# Patient Record
Sex: MALE | Race: OTHER | NOT HISPANIC OR LATINO | Employment: PART TIME | URBAN - METROPOLITAN AREA
[De-identification: names, ages, dates, MRNs, and addresses within clinical notes are randomized per-mention and may not be internally consistent; named-entity substitution may affect disease eponyms.]

---

## 2023-07-11 ENCOUNTER — OCCUPATIONAL MEDICINE (OUTPATIENT)
Dept: URGENT CARE | Facility: CLINIC | Age: 23
End: 2023-07-11
Payer: COMMERCIAL

## 2023-07-11 VITALS
TEMPERATURE: 98.1 F | RESPIRATION RATE: 20 BRPM | HEIGHT: 69 IN | HEART RATE: 68 BPM | SYSTOLIC BLOOD PRESSURE: 94 MMHG | OXYGEN SATURATION: 96 % | WEIGHT: 150 LBS | DIASTOLIC BLOOD PRESSURE: 50 MMHG | BODY MASS INDEX: 22.22 KG/M2

## 2023-07-11 DIAGNOSIS — S39.012A STRAIN OF LUMBAR REGION, INITIAL ENCOUNTER: ICD-10-CM

## 2023-07-11 DIAGNOSIS — M54.50 ACUTE RIGHT-SIDED LOW BACK PAIN WITHOUT SCIATICA: ICD-10-CM

## 2023-07-11 PROCEDURE — 3078F DIAST BP <80 MM HG: CPT | Performed by: NURSE PRACTITIONER

## 2023-07-11 PROCEDURE — 3074F SYST BP LT 130 MM HG: CPT | Performed by: NURSE PRACTITIONER

## 2023-07-11 PROCEDURE — 99203 OFFICE O/P NEW LOW 30 MIN: CPT | Performed by: NURSE PRACTITIONER

## 2023-07-11 RX ORDER — KETOROLAC TROMETHAMINE 30 MG/ML
30 INJECTION, SOLUTION INTRAMUSCULAR; INTRAVENOUS ONCE
Status: COMPLETED | OUTPATIENT
Start: 2023-07-11 | End: 2023-07-11

## 2023-07-11 RX ORDER — CYCLOBENZAPRINE HCL 5 MG
5-10 TABLET ORAL
Qty: 15 TABLET | Refills: 0 | Status: SHIPPED | OUTPATIENT
Start: 2023-07-11 | End: 2023-08-22

## 2023-07-11 RX ADMIN — KETOROLAC TROMETHAMINE 30 MG: 30 INJECTION, SOLUTION INTRAMUSCULAR; INTRAVENOUS at 17:32

## 2023-07-11 NOTE — LETTER
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    Medical Information   Related to Industrial Injury? Yes    Subjective Complaints:  DOI: 7/10/23 ELBA: Patient reports he was lifting a heavy box yesterday and he felt a pop in his low back.  He states he did have pain instantly.  He states over the past 24 hours the pain has worsened, he was not able to go to work today.  He has not taken anything for the pain.  He currently reports he has pain with changing positions, walking and bending over to his right low back.  He states it does hurt midline and radiates a little bit to the left as well.  Patient denies any saddle anesthesia radiation of pain into his legs, loss of bowel or bladder control, fever, body aches or IV drug use.  He denies focal progressive neurological deficits or coldness in his extremities as discussed.  He denies direct trauma to his low back previous injuries.     Objective Findings: Physical Exam  Constitutional:       Appearance: Normal appearance.   HENT:      Head: Normocephalic and atraumatic.   Musculoskeletal:      Cervical back: Normal.      Thoracic back: Normal.      Lumbar back: Spasms and tenderness present. No swelling, signs of trauma or bony tenderness. Decreased range of motion. Negative right straight leg raise test and negative left straight leg raise test. No scoliosis.        Back:      Neurological:      Mental Status: He is alert and oriented to person, place, and time.      Cranial Nerves: Cranial nerves 2-12 are intact.      Motor: Motor function is intact.      Gait: Gait is intact.      Deep Tendon Reflexes:      Reflex Scores:       Patellar reflexes are 2+ on the right side and 2+ on the left side.       Pre-Existing Condition(s):     Assessment:   Initial Visit    Status: Additional Care Required  Permanent Disability:No    Plan: Medication  Comments:Toradol injection in clinic, work restrictions, ibuprofen at home Flexeril at night ice and rest    Diagnostics:      Comments:       Disability Information   Status: Released to Restricted Duty    From:  2023  Through: 7/15/2023 Restrictions are: Temporary   Physical Restrictions   Sitting:    Standing:  < or = to 4 hrs/day Stoopin hrs/day Bendin hrs/day   Squattin hrs/day Walking:  < or = to 4 hrs/day Climbin hrs/day Pushin hrs/day   Pullin hrs/day Other:    Reaching Above Shoulder (L):   Reaching Above Shoulder (R):       Reaching Below Shoulder (L):  0 hrs/day Reaching Below Shoulder (R):  0 hrs/day   Not to exceed Weight Limits   Carrying(hrs):   Weight Limit(lb): < or = to 10 pounds Lifting(hrs):   Weight  Limit(lb): < or = to 10 pounds   Comments: Recommend desk or sedentary work.  Toradol injection in clinic, work restrictions, ibuprofen at home Flexeril at night ice and rest    Repetitive Actions   Hands: i.e. Fine Manipulations from Grasping:     Feet: i.e. Operating Foot Controls:     Driving / Operate Machinery:     Health Care Provider’s Original or Electronic Signature  Marium Callahan, A.P.R.N. Health Care Provider’s Original or Electronic Signature    Vazquez Pickett DO MPH     Clinic Name / Location: [facility]  [address] [place], [place] [address]  [place] [address] Clinic Phone Number: Dept: [phone]   Appointment Time: 5:15 Pm Visit Start Time: 5:18 PM   Check-In Time:   5:16 Pm Visit Discharge Time:  5:45 PM   Original-Treating Physician or Chiropractor    Page 2-Insurer/TPA    Page 3-Employer    Page 4-Employee

## 2023-07-11 NOTE — LETTER
"EMPLOYEE’S CLAIM FOR COMPENSATION/ REPORT OF INITIAL TREATMENT  FORM C-4  PLEASE TYPE OR PRINT    EMPLOYEE’S CLAIM - PROVIDE ALL INFORMATION REQUESTED   First Name  Gregg Last Name  Darby Birthdate                    2000                Sex  male Claim Number (Insurer’s Use Only)   Home Address  [address] Age  23 y.o. 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 (if applicable)  LIFTING BOXES    How did this injury or occupational disease occur? (Be specific and answer in detail. Use additional sheet if necessary)  LIFTING A BOX   If you believe that you have an occupational disease, when did you first have knowledge of the disability and its relationship to your employment?  N/A Witnesses to the Accident (if applicable)  FAIZA ELIAS      Nature of Injury or Occupational Disease  Sprain  Part(s) of Body Injured or Affected  Lower Back Area (Lumbar Area & Lumbo-Sacral), N/A, N/A    I CERTIFY THAT THE ABOVE IS TRUE AND CORRECT TO T HE BEST OF MY KNOWLEDGE AND THAT I HAVE " PROVIDED THIS INFORMATION IN ORDER TO OBTAIN THE BENEFITS OF [place], OR [place]).  I HEREBY AUTHORIZE ANY PHYSICIAN, CHIROPRACTOR, SURGEON, PRACTITIONER OR ANY OTHER PERSON, ANY HOSPITAL, INCLUDING [facility] [place] [facility] OR [facility], ANY  MEDICAL SERVICE ORGANIZATION, ANY INSURANCE COMPANY, OR OTHER INSTITUTION OR ORGANIZATION TO RELEASE TO EACH OTHER, ANY MEDICAL OR OTHER INFORMATION, INCLUDING BENEFITS PAID OR PAYABLE, PERTINENT TO THIS INJURY OR DISEASE, EXCEPT INFORMATION RELATIVE TO DIAGNOSIS, TREATMENT AND/OR COUNSELING FOR AIDS, PSYCHOLOGICAL CONDITIONS, ALCOHOL OR CONTROLLED SUBSTANCES, FOR WHICH I MUST GIVE SPECIFIC AUTHORIZATION.  A PHOTOSTAT OF THIS AUTHORIZATION SHALL BE VALID AS THE ORIGINAL.       Date7/11/2023   [facility] Employee’s Original or  *Electronic Signature   THIS REPORT MUST BE COMPLETED AND MAILED WITHIN 3 WORKING DAYS OF TREATMENT   Place  [facility]  Name of Facility  [facility]   Date  7/11/2023 Diagnosis and Description of Injury or Occupational Disease  (M54.50) Acute right-sided low back pain without sciatica  (S39.012A) Strain of lumbar region, initial encounter Is there evidence that the injured employee was under the influence of alcohol and/or another controlled substance at the time of accident?  ? No ? Yes (if yes, please explain)   Hour  5:18 PM   Diagnoses of Acute right-sided low back pain without sciatica and Strain of lumbar region, initial encounter were pertinent to this visit. No   Treatment  Work restrictions, muscle relaxers to be used at night, in clinic Toradol NSAIDs at home and ice.  Have you advised the patient to remain off work five days or     more?    X-Ray Findings      ? Yes Indicate dates:   From   To      From information given by the employee, together with medical evidence, can        you directly connect this injury or  "occupational disease as job incurred?  Yes ? No If no, is the injured employee capable of:  ? full duty  No ? modified duty  Yes   Is additional medical care by a physician indicated?  Yes If modified duty, specify any limitations / restrictions  See D-39   Do you know of any previous injury or disease contributing to this condition or occupational disease?  ? Yes ? No (Explain if yes)                          No   Date  7/11/2023 Print Health Care Provider's  Name  Yany Callahan, COLLEEN I certify that the employer’s copy of  this form was delivered to the employer on:   Address  [address] Insurer’s Use Only     [facility] Zip  [address]    Provider’s Tax ID Number  [id number] Telephone  Dept: [phone]             Health Care Provider’s Original or Electronic Signature  e-SignGENFRANCY, YANY MACIAS Degree (MD,DO, DC,PA-C,APRN)  APRN      * Complete and attach Release of Information (Form C-4A) when injured employee signs C-4 Form electronically  ORIGINAL - TREATING HEALTHCARE PROVIDER PAGE 2 - INSURER/TPA PAGE 3 - EMPLOYER PAGE 4 - EMPLOYEE             Form C-4 (rev.08/21)           BRIEF DESCRIPTION OF RIGHTS AND BENEFITS  (Pursuant to [place] 616C.050)    Notice of Injury or Occupational Disease (Incident Report Form C-1): If an injury or occupational disease (OD) arises out of and in the course of employment, you must provide written notice to your employer as soon as practicable, but no later than 7 days after the accident or OD. Your employer shall maintain a sufficient supply of the required forms.    Claim for Compensation (Form C-4): If medical treatment is sought, the form C-4 is available at the place of initial treatment. A completed \"Claim for Compensation\" (Form C-4) must be filed within 90 days after an accident or OD. The treating physician or chiropractor must, within 3 working days after treatment, complete and mail to the employer, the employer's insurer and " third-party , the Claim for Compensation.    Medical Treatment: If you require medical treatment for your on-the-job injury or OD, you may be required to select a physician or chiropractor from a list provided by your workers’ compensation insurer, if it has contracted with an Organization for Managed Care (MCO) or Preferred Provider Organization (PPO) or providers of health care. If your employer has not entered into a contract with an MCO or PPO, you may select a physician or chiropractor from the Panel of Physicians and Chiropractors. Any medical costs related to your industrial injury or OD will be paid by your insurer.    Temporary Total Disability (TTD): If your doctor has certified that you are unable to work for a period of at least 5 consecutive days, or 5 cumulative days in a 20-day period, or places restrictions on you that your employer does not accommodate, you may be entitled to TTD compensation.    Temporary Partial Disability (TPD): If the wage you receive upon reemployment is less than the compensation for TTD to which you are entitled, the insurer may be required to pay you TPD compensation to make up the difference. TPD can only be paid for a maximum of 24 months.    Permanent Partial Disability (PPD): When your medical condition is stable and there is an indication of a PPD as a result of your injury or OD, within 30 days, your insurer must arrange for an evaluation by a rating physician or chiropractor to determine the degree of your PPD. The amount of your PPD award depends on the date of injury, the results of the PPD evaluation, your age and wage.    Permanent Total Disability (PTD): If you are medically certified by a treating physician or chiropractor as permanently and totally disabled and have been granted a PTD status by your insurer, you are entitled to receive monthly benefits not to exceed 66 2/3% of your average monthly wage. The amount of your PTD payments is subject to  reduction if you previously received a lump-sum PPD award.    Vocational Rehabilitation Services: You may be eligible for vocational rehabilitation services if you are unable to return to the job due to a permanent physical impairment or permanent restrictions as a result of your injury or occupational disease.    Transportation and Per Felipe Reimbursement: You may be eligible for travel expenses and per felipe associated with medical treatment.    Reopening: You may be able to reopen your claim if your condition worsens after claim closure.     Appeal Process: If you disagree with a written determination issued by the insurer or the insurer does not respond to your request, you may appeal to the Department of Administration, , by following the instructions contained in your determination letter. You must appeal the determination within 70 days from the date of the determination letter at [address], [address], [place], [place] [address], or [address], [address], [place] [address]. If you disagree with the  decision, you may appeal to the Department of Administration, . You must file your appeal within 30 days from the date of the  decision letter at [address], [address], [place], [place] [address], or [address], [address], [place], [place] [address]. If you disagree with a decision of an , you may file a petition for judicial review with the District Court. You must do so within 30 days of the Appeal Officer’s decision. You may be represented by an  at your own expense or you may contact the [facility] for possible representation.    [place]  for Injured Workers (NAIW): If you disagree with a  decision, you may request that NAIW represent you without charge at an  Hearing. For information regarding denial of benefits, you may contact the [facility] at: [address], [address], [place] [address], [phone], or [address], [address], [place] [address], [phone]    To File a Complaint with the Division: If you wish to file a complaint with the  of the Division of Industrial Relations (DIR),  please contact the Workers’ Compensation Section, [address], [address], [place], [place] [address], telephone [phone], or [address], [address], [place], [place] [address], telephone [phone].    For assistance with Workers’ Compensation Issues: You may contact the [facility] Office for Consumer Health Assistance, [address], [address] [facility] [address], [place] [address], Toll Free 1-920.140.5430, Web site: [email or web address].[email or web address]/Programs/ABHISHEK E-mail: [email or web address]              __________________________________________________________________                                    _________________            Employee Name / Signature                                                                                                                            Date                                                                                                                                                                                                                              D-2 (rev. 10/20)

## 2023-07-12 NOTE — PROGRESS NOTES
"Subjective:     Gregg Pastor is a 23 y.o. male who presents for Back Injury (DOI: 7/10/23, lower back pain, was picking up a box )      DOI: 7/10/23 ELBA: Patient reports he was lifting a heavy box yesterday and he felt a pop in his low back.  He states he did have pain instantly.  He states over the past 24 hours the pain has worsened, he was not able to go to work today.  He has not taken anything for the pain.  He currently reports he has pain with changing positions, walking and bending over to his right low back.  He states it does hurt midline and radiates a little bit to the left as well.  Patient denies any saddle anesthesia radiation of pain into his legs, loss of bowel or bladder control, fever, body aches or IV drug use.  He denies focal progressive neurological deficits or coldness in his extremities as discussed.  He denies direct trauma to his low back previous injuries.      PMH:   No pertinent past medical history to this problem  MEDS:  Medications were reviewed in EMR  ALLERGIES:  Allergies were reviewed in EMR  FH:   No pertinent family history to this problem       Objective:     BP 94/50 (BP Location: Left arm, Patient Position: Sitting, BP Cuff Size: Adult)   Pulse 68   Temp 36.7 °C (98.1 °F) (Temporal)   Resp 20   Ht 1.753 m (5' 9\")   Wt 68 kg (150 lb)   SpO2 96%   BMI 22.15 kg/m²     Physical Exam  Constitutional:       Appearance: Normal appearance.   HENT:      Head: Normocephalic and atraumatic.   Musculoskeletal:      Cervical back: Normal.      Thoracic back: Normal.      Lumbar back: Spasms and tenderness present. No swelling, signs of trauma or bony tenderness. Decreased range of motion. Negative right straight leg raise test and negative left straight leg raise test. No scoliosis.        Back:     Neurological:      Mental Status: He is alert and oriented to person, place, and time.      Cranial Nerves: Cranial nerves 2-12 are intact.      Motor: Motor function is intact.      " Gait: Gait is intact.      Deep Tendon Reflexes:      Reflex Scores:       Patellar reflexes are 2+ on the right side and 2+ on the left side.        Assessment/Plan:     Pleasant nontoxic-appearing 23-year-old male presenting clinic with acute low back pain.  HPI and exam findings are consistent with acute strain in the lumbar region.  There is no midline bony tenderness crepitus or step-off throughout.  Low suspicion of acute fracture.  Discussed conservative measures as a management of treatment.  Did give in clinic Toradol this did help relieve the patient's pain.  Did also send Flexeril to the pharmacy. Advised the patient to only take this medication at night, as it may cause drowsiness. Instructed the patient not to take the medication while at work, driving, or operating machinery.  Instructed the patient not to drink alcohol while taking this medication.     1. Acute right-sided low back pain without sciatica  - ketorolac (Toradol) injection 30 mg  - cyclobenzaprine (FLEXERIL) 5 mg tablet; Take 1-2 Tablets by mouth at bedtime as needed for Muscle Spasms.  Dispense: 15 Tablet; Refill: 0    2. Strain of lumbar region, initial encounter    Released to Restricted Duty FROM 7/11/2023 TO 7/15/2023  Recommend desk or sedentary work.  Toradol injection in clinic, work restrictions, ibuprofen at home Flexeril at night ice and rest       Differential diagnosis, natural history, supportive care, and indications for immediate follow-up discussed.

## 2023-08-08 ENCOUNTER — OCCUPATIONAL MEDICINE (OUTPATIENT)
Dept: URGENT CARE | Facility: CLINIC | Age: 23
End: 2023-08-08
Payer: COMMERCIAL

## 2023-08-08 VITALS
RESPIRATION RATE: 18 BRPM | WEIGHT: 111.2 LBS | SYSTOLIC BLOOD PRESSURE: 96 MMHG | OXYGEN SATURATION: 95 % | DIASTOLIC BLOOD PRESSURE: 58 MMHG | BODY MASS INDEX: 16.47 KG/M2 | TEMPERATURE: 97.6 F | HEIGHT: 69 IN | HEART RATE: 71 BPM

## 2023-08-08 DIAGNOSIS — M54.50 ACUTE RIGHT-SIDED LOW BACK PAIN WITHOUT SCIATICA: ICD-10-CM

## 2023-08-08 DIAGNOSIS — S39.012D STRAIN OF LUMBAR REGION, SUBSEQUENT ENCOUNTER: ICD-10-CM

## 2023-08-08 PROCEDURE — 3074F SYST BP LT 130 MM HG: CPT | Performed by: NURSE PRACTITIONER

## 2023-08-08 PROCEDURE — 99213 OFFICE O/P EST LOW 20 MIN: CPT | Performed by: NURSE PRACTITIONER

## 2023-08-08 PROCEDURE — 3078F DIAST BP <80 MM HG: CPT | Performed by: NURSE PRACTITIONER

## 2023-08-08 ASSESSMENT — ENCOUNTER SYMPTOMS: BACK PAIN: 1

## 2023-08-08 NOTE — LETTER
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    Medical Information   Related to Industrial Injury? Yes    Subjective Complaints:  DOI: 7/10/23 ELBA: Patient reports he was lifting a heavy box yesterday and he felt a pop in his low back.  He states he did have pain instantly.  He states over the past 24 hours the pain has worsened, he was not able to go to work today.  He has not taken anything for the pain.  He currently reports he has pain with changing positions, walking and bending over to his right low back.  He states it does hurt midline and radiates a little bit to the left as well.  Patient denies any saddle anesthesia radiation of pain into his legs, loss of bowel or bladder control, fever, body aches or IV drug use.  He denies focal progressive neurological deficits or coldness in his extremities as discussed.  He denies direct trauma to his low back previous injuries.     FV#2- 8/8/23: pt reports only minimal improvement in pain since DOI. He would like to keep current work restrictions as they are helping. He is taking tylenol at home for pain. Finished course of muscle relaxers.    Objective Findings: Lumbar spine- TTP midline directly over spinous process. No localized bony tenderness. No step off or deformity. Slight decrease in ROM in all planes secondary to pain. No radicular pain. SI joints non  tender. Negative SLR. Normal gait.   Pre-Existing Condition(s):     Assessment:   Condition Same    Status: Additional Care Required  Permanent Disability:No    Plan:      Diagnostics:      Comments:  Please use advil or ibuprofen for pain, use tylenol for breakthrough pain  Continue work restrictions  FV in 2 weeks at [facility] since minimal improvement    Disability Information   Status: Released to Restricted Duty    From:  2023  Through: 2023 Restrictions are: Temporary   Physical Restrictions   Sitting:    Standing:    Stoopin hrs/day Bendin hrs/day   Squatting:    Walking:  < or = to 4 hrs/day Climbing:    Pushing:      Pullin hrs/day Other:    Reaching Above Shoulder (L):   Reaching Above Shoulder (R):       Reaching Below Shoulder (L):    Reaching Below Shoulder (R):      Not to exceed Weight Limits   Carrying(hrs):   Weight Limit(lb): < or = to 10 pounds Lifting(hrs):   Weight  Limit(lb): < or = to 10 pounds   Comments:      Repetitive Actions   Hands: i.e. Fine Manipulations from Grasping:     Feet: i.e. Operating Foot Controls:     Driving / Operate Machinery:     Health Care Provider’s Original or Electronic Signature  Edgar Watkins A.P.R.NJalyn Health Care Provider’s Original or Electronic Signature    Vazquez Pickett DO MPH     Clinic Name / Location: [address]  [place] [address] Clinic Phone Number: Dept: [phone]   Appointment Time: 10:45 Am Visit Start Time: 10:45 AM   Check-In Time:  10:31 Am Visit Discharge Time: 11:21 AM   Original-Treating Physician or Chiropractor    Page 2-Insurer/TPA    Page 3-Employer    Page 4-Employee

## 2023-08-08 NOTE — PROGRESS NOTES
"Subjective     Gregg Pastor is a 23 y.o. male who presents with Other (Workers comp follow up visit, back pain, DOI 7/10/23)      DOI: 7/10/23 ELBA: Patient reports he was lifting a heavy box yesterday and he felt a pop in his low back.  He states he did have pain instantly.  He states over the past 24 hours the pain has worsened, he was not able to go to work today.  He has not taken anything for the pain.  He currently reports he has pain with changing positions, walking and bending over to his right low back.  He states it does hurt midline and radiates a little bit to the left as well.  Patient denies any saddle anesthesia radiation of pain into his legs, loss of bowel or bladder control, fever, body aches or IV drug use.  He denies focal progressive neurological deficits or coldness in his extremities as discussed.  He denies direct trauma to his low back previous injuries.     FV#2- 8/8/23: pt reports only minimal improvement in pain since DOI. He would like to keep current work restrictions as they are helping. He is taking tylenol at home for pain. Finished course of muscle relaxers.      HPI    Review of Systems   Musculoskeletal:  Positive for back pain.   All other systems reviewed and are negative.             Objective     BP 96/58   Pulse 71   Temp 36.4 °C (97.6 °F) (Temporal)   Resp 18   Ht 1.753 m (5' 9\")   Wt 50.4 kg (111 lb 3.2 oz)   SpO2 95%   BMI 16.42 kg/m²      Physical Exam  Vitals and nursing note reviewed.   Constitutional:       Appearance: Normal appearance.   HENT:      Head: Normocephalic and atraumatic.      Nose: Nose normal.      Mouth/Throat:      Mouth: Mucous membranes are moist.      Pharynx: Oropharynx is clear.   Eyes:      Extraocular Movements: Extraocular movements intact.      Pupils: Pupils are equal, round, and reactive to light.   Cardiovascular:      Rate and Rhythm: Normal rate and regular rhythm.   Pulmonary:      Effort: Pulmonary effort is normal. "   Musculoskeletal:         General: Normal range of motion.      Cervical back: Normal range of motion and neck supple.   Skin:     General: Skin is warm and dry.      Capillary Refill: Capillary refill takes less than 2 seconds.   Neurological:      General: No focal deficit present.      Mental Status: He is alert and oriented to person, place, and time. Mental status is at baseline.   Psychiatric:         Mood and Affect: Mood normal.         Thought Content: Thought content normal.         Judgment: Judgment normal.         Lumbar spine- TTP midline directly over spinous process. No localized bony tenderness. No step off or deformity. Slight decrease in ROM in all planes secondary to pain. No radicular pain. SI joints non tender. Negative SLR. Normal gait.                   Assessment & Plan        1. Acute right-sided low back pain without sciatica    2. Strain of lumbar region, subsequent encounter     Please use advil or ibuprofen for pain, use tylenol for breakthrough pain  Continue work restrictions  FV in 2 weeks at [facility] since minimal improvement

## 2023-08-22 ENCOUNTER — OCCUPATIONAL MEDICINE (OUTPATIENT)
Dept: OCCUPATIONAL MEDICINE | Facility: CLINIC | Age: 23
End: 2023-08-22
Payer: COMMERCIAL

## 2023-08-22 VITALS
HEIGHT: 64 IN | TEMPERATURE: 97 F | HEART RATE: 71 BPM | SYSTOLIC BLOOD PRESSURE: 110 MMHG | OXYGEN SATURATION: 98 % | WEIGHT: 109 LBS | DIASTOLIC BLOOD PRESSURE: 60 MMHG | BODY MASS INDEX: 18.61 KG/M2

## 2023-08-22 DIAGNOSIS — S39.012D STRAIN OF LUMBAR REGION, SUBSEQUENT ENCOUNTER: ICD-10-CM

## 2023-08-22 DIAGNOSIS — M54.50 ACUTE RIGHT-SIDED LOW BACK PAIN WITHOUT SCIATICA: ICD-10-CM

## 2023-08-22 PROCEDURE — 3074F SYST BP LT 130 MM HG: CPT | Performed by: NURSE PRACTITIONER

## 2023-08-22 PROCEDURE — 99213 OFFICE O/P EST LOW 20 MIN: CPT | Performed by: NURSE PRACTITIONER

## 2023-08-22 PROCEDURE — 3078F DIAST BP <80 MM HG: CPT | Performed by: NURSE PRACTITIONER

## 2023-08-22 RX ORDER — CYCLOBENZAPRINE HCL 5 MG
10 TABLET ORAL 3 TIMES DAILY PRN
Qty: 30 TABLET | Refills: 0 | Status: SHIPPED | OUTPATIENT
Start: 2023-08-22

## 2023-08-22 NOTE — PROGRESS NOTES
"Subjective:     Gregg Pastor is a 23 y.o. male who presents for Other ([facility] DOI 7/10/23 back injury, feeling worse room 16)      DOI: 7/10/23 ELBA: Patient reports he was lifting a heavy box yesterday and he felt a pop in his low back.  Patient seen in urgent care x2 for this injury.   offered but declined by patient at this visit.  Today he reports symptoms have improved minimally.  He continues to have pain with certain movements, walking for extended period of time, and radiating pain to the middle of his thighs.  He denies leg weakness, bowel or bladder changes, or saddle anesthesia.  He is taking OTC medication, applying ice, and had some relief from the steroid pack.  He states that he did get relief from the muscle relaxer but he ran out.  He has not been doing any stretching exercises or applying heat.  He has been tolerating light duty with minimal difficulty.  Physical therapy referral placed.  Plan of care discussed with patient.  Patient denies previous back injury, surgeries, or second job.    ROS: All systems were reviewed on intake form, form was reviewed and signed. See scanned documents in media. Pertinent positives and negatives included in HPI.    PMH: No pertinent past medical history to this problem  MEDS: Medications were reviewed in Epic  ALLERGIES: No Known Allergies  SOCHX: Works as  at [employer]  FH: No pertinent family history to this problem       Objective:     /60   Pulse 71   Temp 36.1 °C (97 °F)   Ht 1.626 m (5' 4\")   Wt 49.4 kg (109 lb)   SpO2 98%   BMI 18.71 kg/m²     [unfilled]    Lumbar: No gross deformity noted.  Mild tenderness to paraspinal musculature L3-S1 and right SI joint.  Neg decreased flexion or rotation.  Straight leg test negative bilaterally.  Able to heel/toe walk with minimal difficulty. Cranial nerves grossly intact.  Achilles and patellar reflexes 2+ bilaterally.   Sensation intact. No gait abnormalities.      Assessment/Plan: "       1. Acute right-sided low back pain without sciatica  - Referral to Physical Therapy  - cyclobenzaprine (FLEXERIL) 5 mg tablet; Take 2 Tablets by mouth 3 times a day as needed for Moderate Pain.  Dispense: 30 Tablet; Refill: 0    2. Strain of lumbar region, subsequent encounter  - Referral to Physical Therapy  - cyclobenzaprine (FLEXERIL) 5 mg tablet; Take 2 Tablets by mouth 3 times a day as needed for Moderate Pain.  Dispense: 30 Tablet; Refill: 0    Released to Restricted Duty FROM 8/22/2023 TO 9/12/2023  Avoid lifting, pushing, pulling, or carrying more than 25 pounds  Follow-up in 2 weeks  Restricted duty  Physical therapy referral placed  Take cyclobenzaprine as prescribed do not work or drive while taking this medication due to sedating effects  Take OTC Tylenol/ibuprofen, ice/heat application, and OTC topical ointment of your choosing i.e. Tiger balm, Voltaren gel, or Biofreeze  Recommend gentle range of motion and stretching exercises as tolerated and demonstrated  Strict ED precautions discussed with patient    Differential diagnosis, natural history, supportive care, and indications for immediate follow-up discussed.    Approximately 25 minutes were spent in reviewing notes, preparing for visit, obtaining history, exam and evaluation, patient counseling/education and post visit documentation/orders.

## 2023-09-12 ENCOUNTER — OCCUPATIONAL MEDICINE (OUTPATIENT)
Dept: OCCUPATIONAL MEDICINE | Facility: CLINIC | Age: 23
End: 2023-09-12
Payer: COMMERCIAL

## 2023-09-12 VITALS
OXYGEN SATURATION: 97 % | DIASTOLIC BLOOD PRESSURE: 62 MMHG | HEART RATE: 85 BPM | RESPIRATION RATE: 16 BRPM | SYSTOLIC BLOOD PRESSURE: 104 MMHG | TEMPERATURE: 97.1 F

## 2023-09-12 DIAGNOSIS — S39.012D STRAIN OF LUMBAR REGION, SUBSEQUENT ENCOUNTER: ICD-10-CM

## 2023-09-12 DIAGNOSIS — M54.50 ACUTE RIGHT-SIDED LOW BACK PAIN WITHOUT SCIATICA: ICD-10-CM

## 2023-09-12 PROCEDURE — 3078F DIAST BP <80 MM HG: CPT | Performed by: NURSE PRACTITIONER

## 2023-09-12 PROCEDURE — 3074F SYST BP LT 130 MM HG: CPT | Performed by: NURSE PRACTITIONER

## 2023-09-12 PROCEDURE — 99213 OFFICE O/P EST LOW 20 MIN: CPT | Performed by: NURSE PRACTITIONER

## 2023-09-12 ASSESSMENT — ENCOUNTER SYMPTOMS
RESPIRATORY NEGATIVE: 1
PSYCHIATRIC NEGATIVE: 1
MYALGIAS: 1
CONSTITUTIONAL NEGATIVE: 1
CARDIOVASCULAR NEGATIVE: 1
SENSORY CHANGE: 0
WEAKNESS: 0
TINGLING: 0
BACK PAIN: 0

## 2023-09-12 NOTE — LETTER
[address],   [address] [place] [address]  Phone:  [phone] - Fax:  [phone]   Occupational Health [facility] Progress Report and Disability Certification  Date of Service: 9/12/2023   No Show:  No  Date / Time of Next Visit:  Discharged/MMI   Released to full duty    Claim Information   Patient Name: Gregg Pastor  Claim Number:     Employer:   [employer] Date of Injury: 7/10/2023     Insurer / TPA: [employer]  ID / SSN:     Occupation: [employer]  Diagnosis: Diagnoses of Acute right-sided low back pain without sciatica and Strain of lumbar region, subsequent encounter were pertinent to this visit.    Medical Information   Related to Industrial Injury? Yes    Subjective Complaints:  DOI: 7/10/23 ELBA: Patient reports he was lifting a heavy box yesterday and he felt a pop in his low back.    offered but declined by patient at this visit.  Today he is feeling much better.  He denies leg weakness, bowel or bladder changes, or saddle anesthesia.  He is taking OTC medication, applying ice, and muscle relaxer moderate relief.  He has not been doing any stretching exercises or applying heat.  He has been tolerating light duty with minimal difficulty.  He will return to work at full duty at this time.  Physical therapy referral closed due to no follow-up.  Plan of care discussed with patient.  Patient denies previous back injury, surgeries, or second job.   Objective Findings: Lumbar: No gross deformity noted.  No tenderness to paraspinal musculature L3-S1 and right SI joint.  Neg decreased flexion or rotation.  Straight leg test negative bilaterally.  Able to heel/toe walk with minimal difficulty. Cranial nerves grossly intact.  Achilles and patellar reflexes 2+ bilaterally.   Sensation intact. No gait abnormalities.      Pre-Existing Condition(s):     Assessment:   Condition Improved    Status: Discharged /  MMI  Permanent Disability:No    Plan:       Diagnostics:      Comments:  Discharged/MMI  Released to full duty  Follow-up if needed    Disability Information   Status: Released to Full Duty    From:  9/12/2023  Through:   Restrictions are:     Physical Restrictions   Sitting:    Standing:    Stooping:    Bending:      Squatting:    Walking:    Climbing:    Pushing:      Pulling:    Other:    Reaching Above Shoulder (L):   Reaching Above Shoulder (R):       Reaching Below Shoulder (L):    Reaching Below Shoulder (R):      Not to exceed Weight Limits   Carrying(hrs):   Weight Limit(lb):   Lifting(hrs):   Weight  Limit(lb):     Comments:      Repetitive Actions   Hands: i.e. Fine Manipulations from Grasping:     Feet: i.e. Operating Foot Controls:     Driving / Operate Machinery:     Health Care Provider’s Original or Electronic Signature  COLLEEN Mark Health Care Provider’s Original or Electronic Signature    Vazquez Pickett DO MPH     Clinic Name / Location: [address],   [address] [place] [id number] Clinic Phone Number: Dept: [phone]   Appointment Time: 11:15 Am Visit Start Time: 11:00 AM   Check-In Time:  10:55 Am Visit Discharge Time:  11:12 AM

## 2023-09-12 NOTE — PROGRESS NOTES
Subjective:     Gregg Pastor is a 23 y.o. male who presents for Follow-Up ([facility] DOI 7/10/23 back injury, feeling worse room 17)      DOI: 7/10/23 ELBA: Patient reports he was lifting a heavy box yesterday and he felt a pop in his low back.    offered but declined by patient at this visit.  Today he is feeling much better.  He denies leg weakness, bowel or bladder changes, or saddle anesthesia.  He is taking OTC medication, applying ice, and muscle relaxer moderate relief.  He has not been doing any stretching exercises or applying heat.  He has been tolerating light duty with minimal difficulty.  He will return to work at full duty at this time.  Physical therapy referral closed due to no follow-up.  Plan of care discussed with patient.  Patient denies previous back injury, surgeries, or second job.    Review of Systems   Constitutional: Negative.    Respiratory: Negative.     Cardiovascular: Negative.    Musculoskeletal:  Positive for myalgias. Negative for back pain and joint pain.   Skin: Negative.    Neurological:  Negative for tingling, sensory change and weakness.   Psychiatric/Behavioral: Negative.         SOCHX: Works as a  at [employer]  FH: No pertinent family history to this problem.       Objective:     /62 (BP Location: Left arm, Patient Position: Sitting, BP Cuff Size: Adult)   Pulse 85   Temp 36.2 °C (97.1 °F) (Temporal)   Resp 16   SpO2 97%     Constitutional: Patient is in no acute distress. Appears well-developed and well-nourished.   Cardiovascular: Normal rate.    Pulmonary/Chest: Effort normal. No respiratory distress.   Neurological: Patient is alert and oriented to person, place, and time.   Skin: Skin is warm and dry.   Psychiatric: Normal mood and affect. Behavior is normal.     Lumbar: No gross deformity noted.  No tenderness to paraspinal musculature L3-S1 and right SI joint.  Neg decreased flexion or rotation.  Straight leg test negative bilaterally.  Able to  heel/toe walk with minimal difficulty. Cranial nerves grossly intact.  Achilles and patellar reflexes 2+ bilaterally.   Sensation intact. No gait abnormalities.       Assessment/Plan:       1. Acute right-sided low back pain without sciatica    2. Strain of lumbar region, subsequent encounter    Released to Full Duty FROM 9/12/2023 TO         Discharged/MMI  Released to full duty  Follow-up if needed    Differential diagnosis, natural history, supportive care, and indications for immediate follow-up discussed.    Approximately 25 minutes was spent in preparing for visit, obtaining history, exam and evaluation, patient counseling/education and post visit documentation/orders.